# Patient Record
Sex: MALE | Race: WHITE
[De-identification: names, ages, dates, MRNs, and addresses within clinical notes are randomized per-mention and may not be internally consistent; named-entity substitution may affect disease eponyms.]

---

## 2019-08-01 ENCOUNTER — HOSPITAL ENCOUNTER (OUTPATIENT)
Dept: HOSPITAL 95 - PLD | Age: 34
Discharge: HOME | End: 2019-08-01
Attending: OTOLARYNGOLOGY
Payer: COMMERCIAL

## 2019-08-01 DIAGNOSIS — C96.9: Primary | ICD-10-CM

## 2019-08-14 ENCOUNTER — HOSPITAL ENCOUNTER (OUTPATIENT)
Dept: HOSPITAL 95 - ORSCSDS | Age: 34
Discharge: HOME | End: 2019-08-14
Attending: OTOLARYNGOLOGY
Payer: COMMERCIAL

## 2019-08-14 DIAGNOSIS — C77.0: Primary | ICD-10-CM

## 2019-08-14 DIAGNOSIS — J35.01: ICD-10-CM

## 2019-08-14 PROCEDURE — 0CTPXZZ RESECTION OF TONSILS, EXTERNAL APPROACH: ICD-10-PCS | Performed by: OTOLARYNGOLOGY

## 2019-08-14 PROCEDURE — 0CJS8ZZ INSPECTION OF LARYNX, VIA NATURAL OR ARTIFICIAL OPENING ENDOSCOPIC: ICD-10-PCS | Performed by: OTOLARYNGOLOGY

## 2019-08-14 NOTE — NUR
08/14/19 1237 Rhonda Spangler
PT FEELS HE CAN GET UP AND GET DRESSED NOW. I WILL BEGIN DISCHARGE
PROCESS.

## 2019-09-25 ENCOUNTER — HOSPITAL ENCOUNTER (OUTPATIENT)
Dept: HOSPITAL 95 - ER | Age: 34
Setting detail: OBSERVATION
LOS: 1 days | Discharge: HOME | End: 2019-09-26
Attending: OTOLARYNGOLOGY | Admitting: OTOLARYNGOLOGY
Payer: COMMERCIAL

## 2019-09-25 VITALS — BODY MASS INDEX: 21.2 KG/M2 | HEIGHT: 72.99 IN | WEIGHT: 159.99 LBS

## 2019-09-25 DIAGNOSIS — Z88.5: ICD-10-CM

## 2019-09-25 DIAGNOSIS — Z85.810: ICD-10-CM

## 2019-09-25 DIAGNOSIS — G43.909: ICD-10-CM

## 2019-09-25 DIAGNOSIS — L76.22: Primary | ICD-10-CM

## 2019-09-25 DIAGNOSIS — Z79.899: ICD-10-CM

## 2019-09-25 LAB
BASOPHILS # BLD AUTO: 0.03 K/MM3 (ref 0–0.23)
BASOPHILS NFR BLD AUTO: 1 % (ref 0–2)
DEPRECATED RDW RBC AUTO: 35.5 FL (ref 35.1–46.3)
EOSINOPHIL # BLD AUTO: 0.14 K/MM3 (ref 0–0.68)
EOSINOPHIL NFR BLD AUTO: 2 % (ref 0–6)
ERYTHROCYTE [DISTWIDTH] IN BLOOD BY AUTOMATED COUNT: 11.5 % (ref 11.7–14.2)
HCT VFR BLD AUTO: 41.4 % (ref 37–53)
HGB BLD-MCNC: 14.2 G/DL (ref 13.5–17.5)
IMM GRANULOCYTES # BLD AUTO: 0.01 K/MM3 (ref 0–0.1)
IMM GRANULOCYTES NFR BLD AUTO: 0 % (ref 0–1)
LYMPHOCYTES # BLD AUTO: 2.28 K/MM3 (ref 0.84–5.2)
LYMPHOCYTES NFR BLD AUTO: 35 % (ref 21–46)
MCHC RBC AUTO-ENTMCNC: 34.3 G/DL (ref 31.5–36.5)
MCV RBC AUTO: 85 FL (ref 80–100)
MONOCYTES # BLD AUTO: 0.65 K/MM3 (ref 0.16–1.47)
MONOCYTES NFR BLD AUTO: 10 % (ref 4–13)
NEUTROPHILS # BLD AUTO: 3.48 K/MM3 (ref 1.96–9.15)
NEUTROPHILS NFR BLD AUTO: 53 % (ref 41–73)
NRBC # BLD AUTO: 0 K/MM3 (ref 0–0.02)
NRBC BLD AUTO-RTO: 0 /100 WBC (ref 0–0.2)
PLATELET # BLD AUTO: 450 K/MM3 (ref 150–400)

## 2019-09-25 PROCEDURE — G0378 HOSPITAL OBSERVATION PER HR: HCPCS

## 2019-09-25 NOTE — NUR
recvd report from previous shift rn lexie. pt lying in bed watching tv, suction
device and call light within reach, bed in lowest position, bed rails up x 2,
pt a/o x 4, pleasant/cooperative.

## 2019-09-25 NOTE — NUR
pt resting comfortably. notified hospitalist of consult at approx 1125. pt
denies any need for pain medication at this time

## 2019-09-25 NOTE — NUR
SHIFT SUMMARY: PT REMAINED A/0 X 4, PLEAANT/COOPERATIVE, INDEPENDENT IN ROOM,
NPO UNTIL FOLLOWING SWALLOW EVAL WITH ORDERS FOR THIN LIQUIDS. PT TOLERATING
SLOWLY INGESTING THESE LIQUIDS. PT DENIES PAIN IN THE MOUTH OR THROAT. URINE
OUTPUT >350 ML. NO BM THIS SHIFT. FAMILY VISITED X 1. SURGEON AND HOSPITALIST
ROUNDED ON PT. SWALLOW EVAL AND NUTRITION CONSULT COMPLETED.

## 2019-09-25 NOTE — NUR
Pt arrived, alert and oriented x4. Vital signs stable. Bleeding from surgical
sight. Patient using own suctioning device at bedside. Complaints of mild
pain. Ambulatory. awaiting orders.

## 2019-09-26 LAB
BASOPHILS # BLD AUTO: 0.04 K/MM3 (ref 0–0.23)
BASOPHILS NFR BLD AUTO: 1 % (ref 0–2)
DEPRECATED RDW RBC AUTO: 34.8 FL (ref 35.1–46.3)
EOSINOPHIL # BLD AUTO: 0.15 K/MM3 (ref 0–0.68)
EOSINOPHIL NFR BLD AUTO: 2 % (ref 0–6)
ERYTHROCYTE [DISTWIDTH] IN BLOOD BY AUTOMATED COUNT: 11.6 % (ref 11.7–14.2)
HCT VFR BLD AUTO: 38.3 % (ref 37–53)
HGB BLD-MCNC: 13.4 G/DL (ref 13.5–17.5)
IMM GRANULOCYTES # BLD AUTO: 0.02 K/MM3 (ref 0–0.1)
IMM GRANULOCYTES NFR BLD AUTO: 0 % (ref 0–1)
LYMPHOCYTES # BLD AUTO: 2.07 K/MM3 (ref 0.84–5.2)
LYMPHOCYTES NFR BLD AUTO: 28 % (ref 21–46)
MCHC RBC AUTO-ENTMCNC: 35 G/DL (ref 31.5–36.5)
MCV RBC AUTO: 83 FL (ref 80–100)
MONOCYTES # BLD AUTO: 0.79 K/MM3 (ref 0.16–1.47)
MONOCYTES NFR BLD AUTO: 11 % (ref 4–13)
NEUTROPHILS # BLD AUTO: 4.41 K/MM3 (ref 1.96–9.15)
NEUTROPHILS NFR BLD AUTO: 59 % (ref 41–73)
NRBC # BLD AUTO: 0 K/MM3 (ref 0–0.02)
NRBC BLD AUTO-RTO: 0 /100 WBC (ref 0–0.2)
PLATELET # BLD AUTO: 454 K/MM3 (ref 150–400)

## 2019-09-26 NOTE — NUR
SPEECH AND DIETARY IN TO SEE PATIENT. PLAN IS TO REMAIN ON THIN LIQUIDS DUE TO
PATIENT PREFERENCE. POSSIBLE PLAN TO DISCHARGE LATER TODAY.

## 2019-09-26 NOTE — NUR
PT VSS T/O NIGHT. R SIDE OF NECK REMAINS SWOLLEN, PT DENIED SOB. NO
ORAL BLEEDING NOTED, PT USING ORAL SX PRN. INCISION CDI, NO REDNESS/DRNG
NOTED. PT KENROY CLEAR LIQ PO, DOES REP ONGOING STRUGGLE W/SWALLOWING R/T RECENT
TONGUE SURGERY. PT REP PAIN KENROY 4/10. PT C/O FEELING ANXIOUS AND HAVING
DIFFICULTY SLEEPING SINCE DX AND SURGERY. PT WAS ABLE TO SLEEP FOR SEVERAL HRS
AFTER 1X DOSE OF ATIVAN GIVEN. SUPPORT AND THERAPEUTIC COMMUNICATION PROVIDED
PRN T/O NIGHT. REPORT GIVEN TO DAY RN.

## 2019-10-29 ENCOUNTER — HOSPITAL ENCOUNTER (OUTPATIENT)
Dept: HOSPITAL 95 - MHTC | Age: 34
Discharge: HOME | End: 2019-10-29
Attending: RADIOLOGY
Payer: COMMERCIAL

## 2019-10-29 VITALS — BODY MASS INDEX: 20.45 KG/M2 | WEIGHT: 154.32 LBS | HEIGHT: 72.99 IN

## 2019-10-29 DIAGNOSIS — Z79.899: ICD-10-CM

## 2019-10-29 DIAGNOSIS — G43.909: ICD-10-CM

## 2019-10-29 DIAGNOSIS — C01: Primary | ICD-10-CM

## 2019-10-29 DIAGNOSIS — Z90.49: ICD-10-CM

## 2019-10-29 DIAGNOSIS — Z88.5: ICD-10-CM

## 2019-10-29 PROCEDURE — C1769 GUIDE WIRE: HCPCS

## 2019-10-29 NOTE — NUR
PT AOX4. DENIES SEVERE PAIN. PT REPORTS MILD PAIN TO ABD AREA. PT DC'D WITH
HANDWRITTEN RX FOR HYDROCODONE/APAP 7.5/325 SOLUTION FOR PAIN CONTROLL. PT
ADVIED TO NOT TAKE NORCO AND EXCEDRIN DUE TO THE AMOUNT OF APAP IN BOTH
MEDICATIONS. PT STATES HIS UNDERSTANDING. PT IS SCHEDULED WITH [facility]EL TOMORROW
AT 1630 AND IS AWAITING A PHONE CALL FROM ART FOR A FOLLOW UP WITH DR. Villalta. IV DCD WITH CATH INTACT. DENIES ANY QUESTIONS OR CONCERNS. PT LEFT IN
WHEELCHAIR WITH SISTER AND BROTHER IN LAW.

## 2020-08-21 ENCOUNTER — HOSPITAL ENCOUNTER (OUTPATIENT)
Dept: HOSPITAL 95 - LAB | Age: 35
Discharge: HOME | End: 2020-08-21
Attending: FAMILY MEDICINE
Payer: COMMERCIAL

## 2020-08-21 DIAGNOSIS — Z11.59: Primary | ICD-10-CM
